# Patient Record
Sex: FEMALE | Race: WHITE
[De-identification: names, ages, dates, MRNs, and addresses within clinical notes are randomized per-mention and may not be internally consistent; named-entity substitution may affect disease eponyms.]

---

## 2018-12-30 ENCOUNTER — HOSPITAL ENCOUNTER (INPATIENT)
Age: 48
LOS: 2 days | Discharge: HOME | DRG: 205 | End: 2019-01-01
Admitting: INTERNAL MEDICINE
Payer: COMMERCIAL

## 2018-12-30 VITALS — DIASTOLIC BLOOD PRESSURE: 68 MMHG | SYSTOLIC BLOOD PRESSURE: 113 MMHG

## 2018-12-30 VITALS — DIASTOLIC BLOOD PRESSURE: 71 MMHG

## 2018-12-30 VITALS — SYSTOLIC BLOOD PRESSURE: 113 MMHG | DIASTOLIC BLOOD PRESSURE: 69 MMHG

## 2018-12-30 VITALS — DIASTOLIC BLOOD PRESSURE: 72 MMHG

## 2018-12-30 VITALS — DIASTOLIC BLOOD PRESSURE: 77 MMHG | SYSTOLIC BLOOD PRESSURE: 99 MMHG

## 2018-12-30 VITALS — DIASTOLIC BLOOD PRESSURE: 52 MMHG

## 2018-12-30 VITALS — DIASTOLIC BLOOD PRESSURE: 43 MMHG | SYSTOLIC BLOOD PRESSURE: 106 MMHG

## 2018-12-30 DIAGNOSIS — Z88.0: ICD-10-CM

## 2018-12-30 DIAGNOSIS — R73.9: ICD-10-CM

## 2018-12-30 DIAGNOSIS — G62.9: ICD-10-CM

## 2018-12-30 DIAGNOSIS — Z71.6: ICD-10-CM

## 2018-12-30 DIAGNOSIS — D64.9: ICD-10-CM

## 2018-12-30 DIAGNOSIS — Z86.718: ICD-10-CM

## 2018-12-30 DIAGNOSIS — Z83.3: ICD-10-CM

## 2018-12-30 DIAGNOSIS — I25.2: ICD-10-CM

## 2018-12-30 DIAGNOSIS — Z79.82: ICD-10-CM

## 2018-12-30 DIAGNOSIS — Z79.899: ICD-10-CM

## 2018-12-30 DIAGNOSIS — M94.0: Primary | ICD-10-CM

## 2018-12-30 DIAGNOSIS — Z88.5: ICD-10-CM

## 2018-12-30 DIAGNOSIS — Z90.10: ICD-10-CM

## 2018-12-30 DIAGNOSIS — D68.2: ICD-10-CM

## 2018-12-30 DIAGNOSIS — Z95.5: ICD-10-CM

## 2018-12-30 DIAGNOSIS — Z85.3: ICD-10-CM

## 2018-12-30 DIAGNOSIS — J45.909: ICD-10-CM

## 2018-12-30 DIAGNOSIS — E87.8: ICD-10-CM

## 2018-12-30 DIAGNOSIS — N17.0: ICD-10-CM

## 2018-12-30 DIAGNOSIS — F17.200: ICD-10-CM

## 2018-12-30 DIAGNOSIS — Z82.49: ICD-10-CM

## 2018-12-30 LAB
ALBUMIN SERPL-MCNC: 3.6 GM/DL (ref 3.1–4.5)
ALP SERPL-CCNC: 154 U/L (ref 45–117)
ALT SERPL W P-5'-P-CCNC: 37 U/L (ref 12–78)
APTT PPP: 23.5 SECONDS (ref 20.8–31.5)
AST SERPL-CCNC: 17 IU/L (ref 3–35)
BASOPHILS # BLD AUTO: 0 10*3/UL (ref 0–0.1)
BASOPHILS NFR BLD AUTO: 0.4 % (ref 0–1)
BUN SERPL-MCNC: 15 MG/DL (ref 7–24)
CHLORIDE SERPL-SCNC: 109 MMOL/L (ref 98–107)
CREAT SERPL-MCNC: 1.22 MG/DL (ref 0.55–1.02)
EOSINOPHIL # BLD AUTO: 0.2 10*3/UL (ref 0–0.4)
EOSINOPHIL # BLD AUTO: 1.8 % (ref 1–4)
ERYTHROCYTE [DISTWIDTH] IN BLOOD BY AUTOMATED COUNT: 15 % (ref 0–14.5)
HCT VFR BLD AUTO: 36 % (ref 37–47)
HGB BLD-MCNC: 11.5 G/DL (ref 12–16)
INR BLD: 0.9 (ref 2–3.5)
LYMPHOCYTES # BLD AUTO: 3.9 10*3/UL (ref 1.3–4.4)
LYMPHOCYTES NFR BLD AUTO: 35 % (ref 27–41)
MCH RBC QN AUTO: 26.3 PG (ref 27–31)
MCHC RBC AUTO-ENTMCNC: 31.9 G/DL (ref 33–37)
MCV RBC AUTO: 82.4 FL (ref 81–99)
MONOCYTES # BLD AUTO: 0.6 10*3/UL (ref 0.1–1)
MONOCYTES NFR BLD MANUAL: 5.1 % (ref 3–9)
NEUT #: 6.5 10*3/UL (ref 2.3–7.9)
NEUT %: 57.3 % (ref 47–73)
NRBC BLD QL AUTO: 0 10*3/UL (ref 0–0)
PLATELET # BLD AUTO: 262 10*3/UL (ref 130–400)
PMV BLD AUTO: 9.8 FL (ref 9.6–12.3)
POTASSIUM SERPL-SCNC: 3.7 MMOL/L (ref 3.5–5.1)
PROT SERPL-MCNC: 7.7 GM/DL (ref 6.4–8.2)
RBC # BLD AUTO: 4.37 10*6/UL (ref 4.1–5.1)
SODIUM SERPL-SCNC: 141 MMOL/L (ref 136–145)
TROPONIN I SERPL-MCNC: < 0.015 NG/ML (ref ?–0.04)
WBC NRBC COR # BLD AUTO: 11.3 10*3/UL (ref 4.8–10.8)

## 2018-12-30 NOTE — NUR
A 48, admitted to , under the
services of EMI Zuniga DO with a diagnosis of CHEST PAIN.
Chief complaint is CHEST PAIN.
Patient arrived via bed from ER.
Monitor applied. Initial assessment completed.
Vital signs taken and recorded.
EMI ZUNIGA DO notified of admission to the unit.
Orders received.
See assessment for past medical history, medications
and allergies.
Patient and/or family oriented to unit. [facility] reviewed.
Clothing/patient valuable form completed.
 
RYAN WAKEFIELD

## 2018-12-30 NOTE — NUR
AND  AWARE OF VQ SCAN RESULTS. Audra Grover 182 6 13[facility] E  Zheng, [address] [facility]    Consent for Operation  Date: __________________                                Time: _______________    1.  I authorize the performance upon Blanca Mercer the following operation:  Procedure procedure has been videotaped, the surgeon will obtain the original videotape. The hospital will not be responsible for storage or maintenance of this tape.   7. For the purpose of advancing medical education, I consent to the admittance of observers to the STATEMENTS REQUIRING INSERTION OR COMPLETION WERE FILLED IN.     Signature of Patient:   ___________________________    When the patient is a minor or mentally incompetent to give consent:  Signature of person authorized to consent for patient: ____________ supplements, and pills I can buy without a prescription (including street drugs/illegal medications). Failure to inform my anesthesiologist about these medicines may increase my risk of anesthetic complications. iv.  If I am allergic to anything or have ha Anesthesiologist Signature     Date   Time  I have discussed the procedure and information above with the patient (or patient’s representative) and answered their questions. The patient or their representative has agreed to have anesthesia services.     ___

## 2018-12-30 NOTE — EKG
[place], [place]
 
                               ELECTROCARDIOGRAM REPORT
 
        NAME: LAURA ESPARZA                   ACCT #: [id number]  
        UNIT #: [id number]                        ROOM: 520       
        DOCTOR: LAVONNE DRAFT REPORT          BIRTHDATE: [birth year]
 
 
 

 
 
                           [facility]
                                       
Test Date:    2018               Test Time:    09:18:14
Pat Name:     LAURA ESPARZA           Department:   
Patient ID:   [id number]             Room:         [facility] 1
Gender:       F                        Technician:   
:          1970               Requested By: EMI DOBBINS
Order Number: [id number]      Reading MD:   Jonny Quinn MD
                                 Measurements
Intervals                              Axis          
Rate:         80                       P:            43
HI:           135                      QRS:          32
QRSD:         88                       T:            25
QT:           378                                    
QTc:          436                                    
                           Interpretive Statements
Sinus rhythm
Probable inferior infarct, old
Probable anterolateral infarct, old
No previous ECG available for comparison
 
Electronically Signed On 2019 11:53:57 PST by Jonny Quinn MD
 
CM:EKGRPT:ELECTROCARDIOGRAM REPORT
 
D: 1818
T: 19 1153
    
EMI COLON DRAFT REPORT         
EMI DOBBINS DO

## 2018-12-30 NOTE — NUR
PRN MORPHINE GIVEN AS ORDERED FOR PATIENT C/O CHEST PAIN RATING A 8/10.
PATIENT TOLERATED WELL, CALL LIGHT IS WITHIN REACH. WILL MONITOR EFFECT.

## 2018-12-30 NOTE — PR
[place], [place]
 
                                      PROGRESS NOTE
 
        [facility]: LAURA ESPARZA                  [id number]  
        UNIT #: [id number]                       ROOM: 520       
        DOCTOR: RAFY VICENTE MD            BIRTHDATE: [birth year]
 
 
DOS: 12/31/2018
 
CARDIOLOGY PROGRESS NOTE
 
SUBJECTIVE:  The patient was seen at her bedside today.  She is a 48-year-old
woman who has a history of a factor V Leiden mutation.  She has had 2 previous
myocardial infarctions and has had stents placed in the past.  She has also had
a history of DVT.  She chronically takes rivaroxaban 10 mg daily because of her
factor V mutation.  She states lately she has had increased leg pains as well as
increased pleuritic chest pains.  She presented to the hospital where a D-dimer
was high at 0.93.  A V/Q scan was indeterminant.  Lower extremity venous
ultrasound is negative.  Her symptoms, however, are consistent with pulmonary
embolism, and therefore, she is being treated with high dose rivaroxaban for PE
management.
 
She states that she still has pleuritic chest pain despite being on rivaroxaban
for the last few days.  She denies exertional pain, but states that she does not
do much either.
 
PHYSICAL EXAMINATION:
VITAL SIGNS:  Her pulse is 89 and regular, blood pressure is 124/64.  She is
afebrile.
NECK:  Supple.  She has no jugular distention.  Carotids are full.
LUNGS:  Respirations are unlabored.  Chest is clear.  She does have pleuritic
pain when she takes a deep breath.
HEART:  Has a regular rhythm with an S4 gallop.
ABDOMEN:  Benign.
EXTREMITIES:  Showed no cyanosis or edema and she had no palpable cords.
 
IMPRESSION:
1.  History of coronary artery disease.
2.  Atypical chest pain, myocardial infarction ruled out.
3.  Factor V Leiden mutation.
4.  Equivocal presentation for pulmonary embolism.  Symptoms are not suggestive
of an acute myocardial infarction.
 
PLAN:  I think that as soon as the CAT scan machine has been repaired that she
should undergo a CT angiogram of the chest to help determine if she does have a
recurrent or multiple pulmonary emboli.  If that is negative, musculoskeletal
pain is most likely, but given her history, she probably should have a stress
test.
 
We thank the hospitalist physicians for asking our advice regarding her care.
 
 
 
 
                              [place], [place]
 
                                      PROGRESS NOTE
 
        NAME: LAURA ESPARZA                  ACCT #: [id number]  
        UNIT #: [id number]                       ROOM: [facility]       
        DOCTOR: RAFY VICENTE MD            BIRTHDATE: [birth year]
 
 
_________________________________
RAFY VICENTE MD
 
CM:PNTRANS
 
D: 12/31/18 1733                 
T: 01/01/19 0439
             
                                                            
RAFY VICENTE MD            
                                                            
01/02/19
1359
                              
interface

## 2018-12-30 NOTE — NUR
PT C/O LEFT KNEE PAIN/CRAMPING. STATES IT IS MORE SWOLLEN THAN EARLIER TODAY.
ICE PACK PROVIDED FOR COMFORT.  NOTIFIED. SAID HE WOULD COME TO SEE
PT.

## 2018-12-30 NOTE — CON
[place], [place]
 
                                 REPORT OF CONSULTATION
 
        NAME: LAURA ESPARZA                    ACCT #: [id number]  
        UNIT #: [id number]                         ROOM: 520       
        DOCTOR: NISHI NUNEZ MD            BIRTHDATE: [birth year]
 
 
DOS: 2018
 
CARDIOLOGY CONSULTATION
 
REASON FOR CONSULTATION:  Chest pain.
 
HISTORY OF PRESENT ILLNESS:  The patient is a 48-year-old patient with history
of coronary artery disease, factor V mutation, non-morbid obesity, presented to
the Emergency Room for chest pain.  Around 10:00 in the morning, she noted to
have midsternal chest pain, which radiated towards her back and also to the left
arm.  The pain is a dull pain, which is worse with deep inspirations, but no
associated nausea or diaphoresis.  The patient took some sublingual nitro at
home without any relief.  She has history of myocardial infarction in the past
and had a stent in 2016 in [place].  She takes Xarelto 10 mg for her factor V
mutation with hypercoagulable state.  She denies any fever and chills.  No
hemoptysis, no palpitation, no PND, no orthopnea.  No nausea, vomiting,
diarrhea.  No bladder or bowel symptoms, no neurologic symptoms, no visual
symptoms.  She did complain of some discomfort in the right calf area.
 
REVIEW OF SYSTEMS:  Review of the 10 systems negative except as described above.
 
PAST MEDICAL HISTORY:
1.  Coronary artery disease with myocardial infarction in 2013 as well as 2016
with a stent in 2016.
2.  Factor V mutation with hypercoagulable state.
3.  Non-morbid obesity.
4.  Asthma.
 
PAST SURGICAL HISTORY:  History of mastectomy, history of coronary stents.
 
SOCIAL HISTORY:  The patient does not drink alcohol, does not use drugs.  The
patient does smoke.
 
FAMILY HISTORY:  Mother has diabetes, hypertension, seizures; father 
from myocardial infarction.
 
ALLERGIES:  THE PATIENT IS ALLERGIC TO PENICILLIN AND IBUPROFEN AND TORADOL.
 
HOME MEDICATIONS:  Reviewed.  Pertinent cardiac medications include aspirin,
lisinopril, metoprolol, Xarelto and Ranexa.
 
PHYSICAL EXAMINATION:
VITAL SIGNS:  Blood pressure 99/70, pulse 81, respiratory rate 20.  Weight 87.6
kilos, BMI 34.2.
GENERAL:  Alert, comfortable, in no acute distress.
HEENT:  Pupils are round and equal.  No jaundice.  Tongue is moist and pharynx
clear.
CHEST:  Symmetrical.  Mild tenderness in the left upper chest area.
LUNGS:  Good air entry, clear to auscultation.
HEART:  Regular rhythm, no S3, no palpable thrills.
 
                              [place], [place]
 
                                 REPORT OF CONSULTATION
 
        NAME: LAURA ESPARZA                    ACCT #: [id number]  
        UNIT #: [id number]                         ROOM: [facility]       
        DOCTOR: NISHI NUNEZ MD            BIRTHDATE: [birth year]
 
 
ABDOMEN:  Benign, nontender.  Bowel sounds normal.
EXTREMITIES:  Showed no edema.  Distal pulses palpable.  The patient has some
mild discomfort in the right calf area, but no edema.
SKIN:  Warm and dry.  No cyanosis, no clubbing.
RECTAL:  Deferred.
GENITOURINARY:  Deferred.
NEUROLOGIC:  The patient is alert, oriented.  No focal neurologic deficit.
 
LABORATORIES, IMAGING STUDIES AND EKG:  Reviewed.  EKG showed normal sinus
rhythm with inferolateral nondiagnostic Q-waves.  White cell count 11.3,
hemoglobin 11.5, platelet 262, potassium 3.7, BUN 15, creatinine 1.22, and the
troponins are negative x 3.  The V/Q scan showed a moderate probability for
pulmonary emboli.
 
IMPRESSION:
1.  Chest pain, atypical, myocardial infarction ruled out.
2.  Coronary artery disease with history of myocardial infarction in 2013,
myocardial infarction in 2016, status post 2.4 x 25 mm Promus drug-eluting stent
to the right coronary artery in [place].
3.  Factor V mutation.
4.  Intermediate probability V/Q scan for pulmonary emboli.
5.  Hypertension.
6.  Non-morbid obesity.
7.  Tobacco use.
 
RECOMMENDATIONS:
1.  Her chest pains are atypical, pleuritic and myocardial infarction ruled out.
 EKG showed no acute ischemic changes.
2.  V/Q scan showed intermediate probability for pulmonary emboli.  Her Xarelto
dose was changed to acute PE does.
3.  Continue her home cardiac medications including aspirin, beta blocker,
statin.
4.  No further cardiac testing at this time.
5.  Risk factor modification was discussed.
6.  There is no family at bedside at the time of my examination.
 
 
 
_________________________________
NISHI NUNEZ MD
 
CM:CONSTR:REPORT OF CONSULTATION
 
D: 18 4640   T: 18     0251                                          interface

## 2018-12-30 NOTE — EKG
[place], [place]
 
                               ELECTROCARDIOGRAM REPORT
 
        NAME: LAURA ESPARZA                   ACCT #: [id number]  
        UNIT #: [id number]                        ROOM: 520       
        DOCTOR: LAVONNE DRAFT REPORT          BIRTHDATE: [birth year]
 
 
 

 
 
                           [facility]
                                       
Test Date:    2018               Test Time:    03:44:58
Pat Name:     LAURA ESPARZA           Department:   
Patient ID:   [id number]             Room:         [facility] 1
Gender:       F                        Technician:   
:          1970               Requested By: EMI DOBBINS
Order Number: [id number]      Reading MD:   Jonny Quinn MD
                                 Measurements
Intervals                              Axis          
Rate:         102                      P:            49
LA:           124                      QRS:          43
QRSD:         77                       T:            29
QT:           349                                    
QTc:          455                                    
                           Interpretive Statements
Sinus tachycardia
Probable left atrial enlargement
Borderline T wave abnormalities
No previous ECG available for comparison
 
Electronically Signed On 2019 11:53:51 PST by Jonny Quinn MD
 
CM:EKGRPT:ELECTROCARDIOGRAM REPORT
 
D: 18 0344
T: 19 1153
    
EMI COLON DRAFT REPORT         
EMI DOBBINS DO

## 2018-12-30 NOTE — EKG
[place], [place]
 
                               ELECTROCARDIOGRAM REPORT
 
        NAME: LAURA ESPARZA                   ACCT #: [id number]  
        UNIT #: [id number]                        ROOM: [facility]       
        DOCTOR: LAVONNE DRAFT REPORT          BIRTHDATE: [birth year]
 
 
 

 
 
                           [facility]
                                       
Test Date:    2018               Test Time:    07:28:25
Pat Name:     LAURA ESPARZA           Department:   
Patient ID:   [id number]             Room:         [facility] 1
Gender:       F                        Technician:   
:          1970               Requested By: EMI DOBBINS
Order Number: [id number]      Reading MD:   Jonny Quinn MD
                                 Measurements
Intervals                              Axis          
Rate:         80                       P:            45
NC:           129                      QRS:          31
QRSD:         79                       T:            43
QT:           374                                    
QTc:          432                                    
                           Interpretive Statements
Sinus rhythm
 
 
Electronically Signed On 2019 11:53:54 PST by Jonny Quinn MD
 
CM:EKGRPT:ELECTROCARDIOGRAM REPORT
 
D: 18 0728
T: 19 1153
    
EMI COLON DRAFT REPORT         
EMI DOBBINS DO

## 2018-12-30 NOTE — NUR
INFORMED  THAT HOME MEDS NEED RECONCILED. SAID HE WOULD LOOK AT THEM.
ALSO INFORMED HIM OF PTs PAIN RATED 8.5/10 DESPITE MORPHINE AT 0630. SAID HE
WILL BE IN TO SEE THE PT SOON. NORCO GIVEN AT THIS TIME. WILL MONITOR FOR
EFFECTIVENESS.

## 2018-12-30 NOTE — EKG
[place], [place]
 
                               ELECTROCARDIOGRAM REPORT
 
        NAME: LAURA ESPARZA                   ACCT #: [id number]  
        UNIT #: [id number]                        ROOM: 520       
        DOCTOR: LAVONNE DRAFT REPORT          BIRTHDATE: [birth year]
 
 
 

 
 
                           [facility]
                                       
Test Date:    2019               Test Time:    03:42:20
Pat Name:     LAURA ESPARZA           Department:   5E
Patient ID:   [id number]             Room:         [facility] 1
Gender:       F                        Technician:   Nicanor Prajapati
:          1970               Requested By: JUANITA CASTRO
Order Number: [id number]      Reading MD:   Jonny Quinn MD
                                 Measurements
Intervals                              Axis          
Rate:         61                       P:            39
IN:           127                      QRS:          21
QRSD:         80                       T:            36
QT:           405                                    
QTc:          408                                    
                           Interpretive Statements
Sinus rhythm
Probable left atrial enlargement
Consider inferior infarct
Probable anterolateral infarct, old
Minimal ST depression, lateral leads
 
 
Electronically Signed On 2019 11:54:01 PST by Jonny Quinn MD
 
CM:EKGRPT:ELECTROCARDIOGRAM REPORT
 
D: 19 0342
T: 19 1154
    
JUANITA COLON DRAFT REPORT         
JUANITA CASTRO DO

## 2018-12-31 VITALS — DIASTOLIC BLOOD PRESSURE: 61 MMHG | SYSTOLIC BLOOD PRESSURE: 107 MMHG

## 2018-12-31 VITALS — SYSTOLIC BLOOD PRESSURE: 113 MMHG | DIASTOLIC BLOOD PRESSURE: 74 MMHG

## 2018-12-31 VITALS — DIASTOLIC BLOOD PRESSURE: 64 MMHG

## 2018-12-31 VITALS — DIASTOLIC BLOOD PRESSURE: 62 MMHG

## 2018-12-31 VITALS — DIASTOLIC BLOOD PRESSURE: 75 MMHG

## 2018-12-31 LAB
25(OH)D3 SERPL-MCNC: 43.6 NG/ML (ref 30–100)
APTT PPP: 26.4 SECONDS (ref 20.8–31.5)
BASOPHILS # BLD AUTO: 0 10*3/UL (ref 0–0.1)
BASOPHILS NFR BLD AUTO: 0.5 % (ref 0–1)
BUN SERPL-MCNC: 14 MG/DL (ref 7–24)
CHLORIDE SERPL-SCNC: 106 MMOL/L (ref 98–107)
CHOLEST SERPL-MCNC: 127 MG/DL (ref ?–200)
CREAT SERPL-MCNC: 0.99 MG/DL (ref 0.55–1.02)
EOSINOPHIL # BLD AUTO: 0.3 10*3/UL (ref 0–0.4)
EOSINOPHIL # BLD AUTO: 3.9 % (ref 1–4)
ERYTHROCYTE [DISTWIDTH] IN BLOOD BY AUTOMATED COUNT: 15 % (ref 0–14.5)
HCT VFR BLD AUTO: 38.1 % (ref 37–47)
HDLC SERPL-MCNC: 30 MG/DL (ref 40–60)
HGB BLD-MCNC: 11.9 G/DL (ref 12–16)
INR BLD: 1 (ref 2–3.5)
LDLC SERPL DIRECT ASSAY-MCNC: 53 MG/DL (ref 9–159)
LYMPHOCYTES # BLD AUTO: 2.7 10*3/UL (ref 1.3–4.4)
LYMPHOCYTES NFR BLD AUTO: 34.2 % (ref 27–41)
MCH RBC QN AUTO: 26 PG (ref 27–31)
MCHC RBC AUTO-ENTMCNC: 31.2 G/DL (ref 33–37)
MCV RBC AUTO: 83.4 FL (ref 81–99)
MONOCYTES # BLD AUTO: 0.5 10*3/UL (ref 0.1–1)
MONOCYTES NFR BLD MANUAL: 7 % (ref 3–9)
NEUT #: 4.2 10*3/UL (ref 2.3–7.9)
NEUT %: 54.1 % (ref 47–73)
NRBC BLD QL AUTO: 0 % (ref 0–0)
PHOSPHATE SERPL-MCNC: 4.2 MG/DL (ref 2.5–4.9)
PLATELET # BLD AUTO: 238 10*3/UL (ref 130–400)
PMV BLD AUTO: 9.9 FL (ref 9.6–12.3)
POTASSIUM SERPL-SCNC: 4.1 MMOL/L (ref 3.5–5.1)
RBC # BLD AUTO: 4.57 10*6/UL (ref 4.1–5.1)
SODIUM SERPL-SCNC: 137 MMOL/L (ref 136–145)
TRIGL SERPL-MCNC: 221 MG/DL (ref ?–150)
TSH SERPL DL<=0.005 MIU/L-ACNC: 2.66 UIU/ML (ref 0.36–4.75)
VITAMIN B12: 563 PG/ML (ref 247–911)
VLDLC SERPL CALC-MCNC: 44 MG/DL (ref 6–40)
WBC NRBC COR # BLD AUTO: 7.7 10*3/UL (ref 4.8–10.8)

## 2018-12-31 NOTE — NUR
2200 MEDICATIONS GIVEN AT THIS TIME AS WELL AS PRN NORCO AND MORPHINE FOR
CHEST PAIN RATING A 10/10. PATIENT TOLERATED WELL, CALL LIGHT IS WITHIN REACH.

## 2018-12-31 NOTE — NUR
in to talk to patient.
Patient states lives at HOME with ALONE.
There are SEVERAL steps in the home.
Physician: NONE AT THIS TIME
Pharmacy: [facility]
Home health services: NONE
Patient's level of ADLs: INDEPENDENT
Patient has working utilities: YES
DME: [facility]
Follow-up physician's appointment after d/c: WILL BE MADE BY HOSPITALIST NURSE
DIRECTOR ON DISCHARGE
Does patient want to access PORTAL?: NO
Discharge plan PT STATES SHE LIVES ALONE AND IS WORKING WITH HER 
FROM INSURANCE TO GET AIDS FOR ASSISTANCE AT HOME.  STATES EVERYTHING IS IN THE
WORKS AND SHE HAS NO OTHER NEEDS AT THIS TIME.  WILL CONTINUE TO FOLLOW..
 
[place]

## 2019-01-01 VITALS — DIASTOLIC BLOOD PRESSURE: 60 MMHG | SYSTOLIC BLOOD PRESSURE: 112 MMHG

## 2019-01-01 VITALS — DIASTOLIC BLOOD PRESSURE: 66 MMHG

## 2019-01-01 VITALS — DIASTOLIC BLOOD PRESSURE: 67 MMHG

## 2019-01-01 NOTE — NUR
SPOKE WITH DR. CASTRO REGARDING 21 BEAT RUN OF [facility], SEE NEW ORDERS.

## 2019-01-01 NOTE — NUR
SPOKE WITH DR. NUNEZ REGARDING 21 BEAT RUN OF [facility]. INFORMED HIM OF CTA
CHEST RESULTS. NO NEW ORDERS AT THIS TIME.